# Patient Record
Sex: FEMALE | Race: WHITE
[De-identification: names, ages, dates, MRNs, and addresses within clinical notes are randomized per-mention and may not be internally consistent; named-entity substitution may affect disease eponyms.]

---

## 2018-01-25 ENCOUNTER — HOSPITAL ENCOUNTER (EMERGENCY)
Dept: HOSPITAL 46 - ED | Age: 28
Discharge: HOME | End: 2018-01-25
Payer: COMMERCIAL

## 2018-01-25 VITALS — HEIGHT: 69 IN | WEIGHT: 164.99 LBS | BODY MASS INDEX: 24.44 KG/M2

## 2018-01-25 DIAGNOSIS — Z88.5: ICD-10-CM

## 2018-01-25 DIAGNOSIS — R10.30: Primary | ICD-10-CM

## 2018-01-25 DIAGNOSIS — Z90.49: ICD-10-CM

## 2018-01-25 DIAGNOSIS — Z88.0: ICD-10-CM

## 2018-01-25 DIAGNOSIS — Z98.890: ICD-10-CM

## 2018-01-25 DIAGNOSIS — Z88.8: ICD-10-CM

## 2018-01-25 DIAGNOSIS — F17.200: ICD-10-CM

## 2018-01-25 DIAGNOSIS — Z90.89: ICD-10-CM

## 2018-03-26 ENCOUNTER — HOSPITAL ENCOUNTER (EMERGENCY)
Dept: HOSPITAL 46 - ED | Age: 28
Discharge: HOME | End: 2018-03-26
Payer: COMMERCIAL

## 2018-03-26 VITALS — HEIGHT: 69 IN | WEIGHT: 164.99 LBS | BODY MASS INDEX: 24.44 KG/M2

## 2018-03-26 DIAGNOSIS — F17.200: ICD-10-CM

## 2018-03-26 DIAGNOSIS — N39.0: Primary | ICD-10-CM

## 2018-03-26 DIAGNOSIS — Z88.0: ICD-10-CM

## 2018-03-26 DIAGNOSIS — Z88.8: ICD-10-CM

## 2018-03-26 DIAGNOSIS — Z88.5: ICD-10-CM

## 2018-03-27 ENCOUNTER — HOSPITAL ENCOUNTER (INPATIENT)
Dept: HOSPITAL 46 - ED | Age: 28
LOS: 2 days | Discharge: HOME | DRG: 742 | End: 2018-03-29
Attending: GENERAL PRACTICE | Admitting: GENERAL PRACTICE
Payer: COMMERCIAL

## 2018-03-27 VITALS — BODY MASS INDEX: 24.66 KG/M2 | WEIGHT: 166.49 LBS | HEIGHT: 69 IN

## 2018-03-27 DIAGNOSIS — Z87.74: ICD-10-CM

## 2018-03-27 DIAGNOSIS — Z97.5: ICD-10-CM

## 2018-03-27 DIAGNOSIS — D89.89: ICD-10-CM

## 2018-03-27 DIAGNOSIS — Z88.8: ICD-10-CM

## 2018-03-27 DIAGNOSIS — F17.200: ICD-10-CM

## 2018-03-27 DIAGNOSIS — G62.9: ICD-10-CM

## 2018-03-27 DIAGNOSIS — N73.9: ICD-10-CM

## 2018-03-27 DIAGNOSIS — Z79.899: ICD-10-CM

## 2018-03-27 DIAGNOSIS — Z88.5: ICD-10-CM

## 2018-03-27 DIAGNOSIS — N39.0: ICD-10-CM

## 2018-03-27 DIAGNOSIS — N83.202: Primary | ICD-10-CM

## 2018-03-27 DIAGNOSIS — R11.2: ICD-10-CM

## 2018-03-27 DIAGNOSIS — Z88.0: ICD-10-CM

## 2018-03-27 DIAGNOSIS — N83.201: ICD-10-CM

## 2018-03-27 PROCEDURE — G0378 HOSPITAL OBSERVATION PER HR: HCPCS

## 2018-03-27 NOTE — NUR
PATIENT SITTING UP IN BED RESTING. PATIENT STATED PAIN WAS A 7 OUT OF 10.
PATIENT ASKED WHEN NEXT PAIN MED WAS, THIS CNA TOLD HER SHE DID NOT KNOW BUT
WOULD ASK THE NURSE. RN NOTIFIED.
CALL LIGHT IN REACH. FRESH ICE WATER AT BEDSIDE. NO OTHER NEEDS AT THIS TIME.

## 2018-03-27 NOTE — NUR
UPDATED DR. CORONA REGARDING PATIENT'S PAIN. RECEIVED ORDER TO INCREASE
PERCOCET TO 2 TABS Q4H. ALSO UPDATED DR. CORONA REGARDING BLOODY URINE.
RECEIVED NO NEW ORDERS REGARDING URINE, RN TO MONITOR FOR THE NIGHT.

## 2018-03-27 NOTE — NUR
GAVE PT A WARM PACK TO ASSIST WITH PAIN.  NO PAIN MEDS AVAILABLE FOR 2 MORE
HOURS.  PT HAS FAMILY VISITING.  NO NEEDS AT THIS TIME.

## 2018-03-27 NOTE — NUR
CALLED DR CORONA RE: PAIN CONTROL.  IV DILAUDID NOT EFFECTIVE IN CONTROLLING
PAIN FOR 3 HOURS.  TELEPHONE ORDER TO CHANGE TO PERCOCET , 1 TAB Q4HRS
PRN FOR PAIN.

## 2018-03-27 NOTE — NUR
PT CALLED WITH COMPLAINTS OF NAUSEA AND PAIN AT 7/10. ADMINISTERED ZOFRAN AND
DILAUDID. PT DENIES FURTHER NEEDS AT THIS TIME.

## 2018-03-27 NOTE — NUR
PT ARRIVED BY STRETCHER, ALERT AND ORIENTED. PT ORIENTED TO ROOM, VITAL SIGNS
TAKEN, ADMISSION ASSESSMENT COMPLETE AND CHARTED.  DR CORONA CAME IN TO
DISCUSS UPDATED PLAN OF CARE AND CHANGES TO ABX.

## 2018-03-27 NOTE — NUR
RECEIVED REPORT FROM RN. PATIENT IS RESTING IN BED, BREATHING IS EVEN AND
UNLABORED. REPORTS 7/10 PAIN IN ABD. CALL LIGHT WITHIN REACH.

## 2018-03-27 NOTE — NUR
PATIENT CONTINUES TO HAVE 7/10 PAIN IN PELVIS AND 5/10 BILATERAL FLANK PAIN.
PRN DILAUDID AND PERCOCET GIVEN PER EMAR. DENIES FURTHER NEEDS. ASSESSMENT
DONE. CALL LIGHT WITHIN REACH.

## 2018-03-28 PROCEDURE — 0UPDXHZ REMOVAL OF CONTRACEPTIVE DEVICE FROM UTERUS AND CERVIX, EXTERNAL APPROACH: ICD-10-PCS | Performed by: GENERAL PRACTICE

## 2018-03-28 NOTE — NUR
PT REQUESTING JULIETTE PAD AND UNDERWEAR. REPORTS HAVING SMALL AMOUNT OF VAGINAL
BLEEDING. WILL CONTINUE TO MONITOR.

## 2018-03-28 NOTE — NUR
PT COMPLAINED OF NAUSEA AND ABD PAIN. PAIN 6/10. GAVE 2 TABS PERCOCET 10/325
PO. AT THIS TIME ALSO GAVE 4MG ZOFRAN IV.

## 2018-03-28 NOTE — NUR
md at bedside to remove pts IUD. Pt tolerated well but requesting pain
medication following removal.

## 2018-03-28 NOTE — NUR
PATIENT REPORTS 5/10 PELVIC PAIN, PRN TYLENOL GIVEN PER EMAR. BP OF 95/60
NOTED, WILL CONTINUE TO MONITOR. PATIENT DENIES NEEDS AT THIS TIME. CALL LIGHT
WITHIN REACH.

## 2018-03-28 NOTE — NUR
RECEIVED REPORT FROM RN. PATIENT IS RESTING COMFORTABLY IN BED, BREATHING IS
EVEN AND UNLABORED. FLACC SCORE OF 0. CALL LIGHT WITHIN REACH.

## 2018-03-28 NOTE — NUR
PATIENT REPORTS 6/10 PAIN IN PELVIS, PRN PERCOCET GIVEN PER EMAR. PATIENT
DENIES FURTHER NEEDS. CALL LIGHT WITHIN REACH.

## 2018-03-28 NOTE — NUR
PATIENT RESTING IN BED READING HER
EDUCATION. PATIENT STATED SHE JUST HAD GOTTEN
UP AND WIPED DOWN IN THE BATHROOM AND CHANGED INTO A CLEAN GOWN. THIS CNA SET
PATIENT UP IN BATHROOM FOR ORAL CARE. PATIENT STATED SHE HAD DENTURES BUT
PROBABLY WOULDN'T TAKE THEM OUT WHILE HERE BECAUSE SHE IS UNCOMFORTABLE ABOUT
IT. PATIENT STATED SHE FELT NAUSEOUS BUT WOULD LIKE HER BREAKFAST TRAY LEFT IN
HER ROOM IN CASE SHE FEELS BETTER LATER. CALL LIGHT IN REACH. NO OTHER NEEDS
AT THIS TIME.

## 2018-03-28 NOTE — NUR
PATIENT RESTING IN BED, REPORTS 6/10 PAIN IN PELVIS AND FLANK. PRN PERCOCET
GIVEN PER EMAR, SL ZOFRAN GIVEN FOR NAUSEA. DENIES FURTHER NEEDS. CALL LIGHT
WITHIN REACH.

## 2018-03-28 NOTE — NUR
PATIENT HAS BEEN SLEEPING OFF AND ON THROUGHOUT SHIFT, VSS, URINE OUTPUT QS,
PAIN NOW WELL CONTROLLED WITH INCREASE IN PRN PAIN MEDICATION. PAIN REMAINS IN
PELVIS AND FLANK. PATIENT HAS HAD BLOODY URINE THIS SHIFT, MD WAS MADE AWARE,
ORDERED TO MONITOR OVER NIGHT; NO WORSENING OR IMPROVEMENT. TOLERATING CLEAR
LIQUID DIET, PATIENT STATES "I AM READY FOR SOME REAL FOOD." IV FLUIDS
INFUSING, SBA TO BATHROOM. NO ACUTE CHANGES FROM BEGINNING OF SHIFT.

## 2018-03-28 NOTE — NUR
IN ROOM TO CHECK ON PTS PAIN. PT STATED, "MY PAIN IS BETTER. ITS IS AT A 4."
WILL CONTINUE TO MONITOR PTS PAIN.

## 2018-03-28 NOTE — NUR
PATIENT RESTING COMFORTABLY IN BED, BREATHING IS EVEN AND UNLABORED. REPORTS
6/10 PAIN IN PELVIS, PRN PAIN MEDICATION GIVEN PER EMAR. DENIES FURTHER NEEDS.
CALL LIGHT WITHIN REACH.

## 2018-03-28 NOTE — NUR
PATIENT IS RESTING COMFORTABLY IN BED, BREATHING IS EVEN AND UNLABORED.
REPORTS NAUSEA, 5/10 PAIN IN PELVIS. PRN PROMETHAZINE GIVEN PER EMAR. IV
INTACT, NO SIGNS OF INFILTRATION OR IMFLAMMATION. EDUCATED PATIENT TO NOTIFY
NURSE OF PAIN AT IV SITE, PATIENT STATES UNDERSTANDING. CALL LIGHT WITHIN
REACH.

## 2018-03-28 NOTE — NUR
PATIENT LAYING DOWN RESTING IN BED. PATIENT STATES SHE IS STILL TOO NAUSEOUS
TO EAT. RN NOTIFIED. FRESH ICE WATER AT BEDSIDE TABLE. CALL LIGHT IN REACH. NO
OTHER NEEDS AT THIS TIME.

## 2018-03-28 NOTE — NUR
PATIENT RESTING WITH EYES CLOSED. FRESH ICE WATER AT BEDSIDE. CALL LIGHT IN
REACH. NO OTHER NEEDS AT THIS TIME.

## 2018-03-28 NOTE — NUR
PATIENT RESTING IN BED EYES CLOSED. PATIENT STATES PAIN HAS BEEN AT A 4 OUT OF
10. CALL LIGHT IN REACH. FRESH ICE WATER AT BEDSIDE TABLE. NO OTHER NEEDS AT
THIS TIME.

## 2018-03-28 NOTE — NUR
pt rating pain 6/10 in lower adbomen. gave 0.5mg dilaudid iv. while in room pt
up to bathroom. standby asssit. pt continues to have small amount of
vaginal bleeding.

## 2018-03-28 NOTE — NUR
PATIENT RESTING COMFORTABLY IN BED, BREATHING IS EVEN AND UNLABORED. REPORTS
6/10 PAIN IN PELVIS. PRN DILAUDID GIVEN PER EMAR, BP /75, PULSE IS 64,
RR OF 16 NOTED. WILL CONTINUE TO MONITOR. PATIENT DENIES FURTHER NEEDS. CALL
LIGHT WITHIN REACH.

## 2018-03-29 PROCEDURE — 0UB04ZZ EXCISION OF RIGHT OVARY, PERCUTANEOUS ENDOSCOPIC APPROACH: ICD-10-PCS | Performed by: GENERAL PRACTICE

## 2018-03-29 NOTE — NUR
03/29/18 1118 Caroline Chacon
1111 PT ARRIVED NONAROUSABLE WITH ORAL AIRWAY IN PLACE MAINTAINING
AIRWAY. VSS. RESP EVEN AND UNLABORED.
1118 DECREASED O2 TO 8L O2 %.

## 2018-03-29 NOTE — NUR
PATIENT RESTING IN BED, BREATHING IS EVEN AND UNLABORED. REPORTS 6/10 PAIN IN
PELVIS, PRN DILAUDID GIVEN PER EMAR. BP /54, PULSE IS 59, RR OF 16
NOTED. WILL CONTINUE TO MONITOR VITAL SIGNS. PATIENT REPORTS THAT IV
PROMETHAZINE RELIEVED NAUSEA. NO FURTHER NEEDS AT THIS TIME. CALL LIGHT WITHIN
REACH.

## 2018-03-29 NOTE — NUR
PATIENT RESTIN IN BED ON LEFT SIDE. UPDATED BOARD AND WARM BLANKET GIVEN. CINTHYA
LIGHT IN REACH, NO OTHER NEEDS.

## 2018-03-29 NOTE — NUR
PATIENT WATCHING TV IN BED. VITALS AND I/OS DONE. RT IN TO READ PULSE OX
NUMBERS. CALL LIGHT IN REACH

## 2018-03-29 NOTE — NUR
PT RESTING IN BED. VSS. TOLERATING ROOM AIR, O2 SATS 96%. PT TOLERATING CLEAR
LIQUID, ADVANCED TO REGULAR DIET, ASSISTED WITH ORDERING FOOD. PT DENIES OTHER
NEEDS AT THIS TIME. LUNG SOUNDS CLEAR. BOWLE TONES HYPOACTIVE, PT HAS PASSED
FLATUS.

## 2018-03-29 NOTE — NUR
PT CONTINUES TO BE SLEEPY POST-OP, AROUSABLE TO VOICE. VSS. PT STATES PAIN
4/10. LAP SITES WILL SMALL AMOUNT OF SHADOWING, UNCHANGED FROM ARRIVAL TO
FLOOR. PT DENIES OTHER NEEDS AT THIS TIME.

## 2018-03-29 NOTE — NUR
PATIENT RESTING COMFORTABLY IN BED, BREATHING IS EVEN AND UNLABORED, FLACC
SCORE OF 0. CALL LIGHT WITHIN REACH.

## 2018-03-29 NOTE — NUR
UPDATED DR. CORONA REGARDING PATIENT'S HYPOTENSION AND BRADYCARDIA. PATIENT
STATES SHE IS HAVING 6/10 PAIN IN PELVIS. PER MD, NO MEDICATION TO BE
ADMINISTERED UNTIL AFTER HE IS ABLE TO ASSESS HER. NO OTHER ORDERS AT THIS
TIME.

## 2018-03-29 NOTE — NUR
PT REQUESTING PAIN MEDICATION, RATING PAIN 7/10, GIVEN 0.5 MG IV DILAUDID. PT
MORE AWAKE, ENCOURAGED TO ATTEMPT CLEAR LIQUID DIET. PT VERBALIZED
UNDERSTANDING, ATTEMPTING TO EAT JELLO. PT WEANED TO ROOM AIR, O2 SATS 96%. PT
ASSISTED TO BATHROOM, VOIDED WITHOUT DIFFICULTY, URINE BLOOD TINGES, SMALL
CLOTS VISIBLE IN HAT. ASSISTED BACK TO BED. PT DENIES OTHER NEEDS AT THIS
TIME.

## 2018-03-29 NOTE — NUR
PT TO OR WITH BASILIO DAY SURGERY RN. INFORMED CONSENT SIGN AND ON CHART. PRE
SURGICAL WIPES COMPLETED. PT REFUSED ORAL ANTIBIOTIC DUE TO NAUSEA.

## 2018-03-29 NOTE — NUR
PATIENT'S NIGHT WAS UNEVENTFUL. SHE HAS BEEN RESTING IN BED THROUGHOUT SHIFT.
VSS, URINE OUTPUT QS. CONTINUES TO COMPLAIN OF 5 TO 6/10 PAIN IN PELVIS,
RECEIVING IV DILAUDID PRN. CONTINUES TO HAVE SMALL AMOUNT OF BLOOD FROM
VAGINA, NO ACUTE CHANGES. IV FLUIDS INFUSING, NPO SINCE MIDNIGHT. SBA IN ROOM,
CALLS APPROPRIATELY. NO ACUTE CHANGES FROM BEGINNING OF SHIFT.

## 2018-03-29 NOTE — NUR
PT TOLERATING REGULAR DIET, DENIES NAUSEA. PT GIVEN 1 TAB PERCOCET  MG
IBUPROFEN. DISCUSSED PAIN MANAGEMENT WITH PT, VERBALIZED UNDERSTANDING. PT
DENIES OTHER NEEDS AT THIS TIME.

## 2018-03-29 NOTE — NUR
PT RECEIVED FROM PACU. PT RATING PAIN 5/10, DROWSY BUT AROUSABLE TO VOICE. PT
GIVEN 0.5 MG IV DIALUDID FOR PAIN. PT ONB 2L NC, LUNG SOUNDS CLEAR, CONTINUOUS
PULSE OX IN PLACE. PT WITH LAP SITES X3, SMALL AMOUNT OF SHADOWING TO
BILATERAL LOW ABD DRESSINGS. PT CONTINUES TO COMPLAIN OF NAUSEA, CLEAR LIQUID
DIET, PT ENCOURAGED TO DRINK SLOWLY. DISCUSSED PLAN OF CARE AND POST-OP
TEACHING. PT DENIES OTHER NEEDS AT THIS TIME.

## 2018-03-31 NOTE — OR
[facility]
                                    [address]
                                  [place], [place]  [address]
_________________________________________________________________________________________
                                                                 Signed   
 
 
DATE OF OPERATION:
03/29/2018
 
SURGEON:
Alejandro Grady MD
 
PREOPERATIVE DIAGNOSES:
Pelvic pain, bilateral hemorrhagic ovarian cyst, and pelvic inflammatory disease.
 
POSTOPERATIVE DIAGNOSES:
Pelvic pain, bilateral hemorrhagic ovarian cysts, and pelvic inflammatory disease.
 
PROCEDURE:
Laparoscopic right ovarian cystectomy.
 
ASSISTANT:
Dr. Swanson.
 
ANESTHESIA:
General.
 
ESTIMATED BLOOD LOSS:
25 mL.
 
SPECIMEN:
Ovarian cyst wall.
 
CULTURES:
None.
 
DRAINS:
None.
 
FINDINGS:
Normal cervix.  Normal size, shaped uterus.  Anterior and posterior cul-de-sac was free
of any endometriosis or adhesions.  There was a small amount of clear fluid within the
posterior cul-de-sac.  Left tube was normal in length and the normal-appearing
fimbriated end and the left ovary is normal size and shape without any evidence of
endometriosis or adhesions.  No cyst seen.  Right tube was normal in length and
normal-appearing fimbriated end and no adhesions.  Right ovary was enlarged with a 2.5
cm hemorrhagic ovarian cyst in the posterior aspect of the ovary.  There were no
adhesions or lesions seen.  The appendix appeared normal.  The rest of the pelvis was
 
    Electronically Signed By: ALEJANDRO GRADY MD  03/31/18 0945
_________________________________________________________________________________________
PATIENT NAME:     RUFINO OAKES                   
MEDICAL RECORD #: [id number]            OPERATIVE REPORT              
          ACCT #: [id number]  
DATE OF BIRTH:   [birth year]            REPORT #: [id number]      
PHYSICIAN:        ALEJANDRO GRADY MD      
PCP:              CHAZ MCCAULEY          
REPORT IS CONFIDENTIAL AND NOT TO BE RELEASED WITHOUT AUTHORIZATION
 
 
                                  [facility]
                                    [address], [place]  [address]
_________________________________________________________________________________________
                                                                 Signed   
 
 
free of any masses or adhesions. 
 
COMPLICATIONS:
None.
 
DESCRIPTION OF PROCEDURE:
The patient was brought to the operating room and placed in supine position.  After
adequate general anesthesia was obtained, she was placed in dorsal lithotomy position,
prepped and draped in usual sterile fashion.  Harvey catheter was placed in the bladder.
Weighted speculum was placed in the vagina and the anterior lip of cervix grasped with
an Allis clamp.  Uterine cavity was sounded to 9 cm and Hulka clamp carefully introduced
into the cervical canal, attached to the anterior lip of the cervix.  The Allis clamp
and weighted speculum were removed.  Attention was then drawn to the abdomen. 
 
A small infraumbilical skin incision was made through previous surgical scar after
injecting with 0.5% Marcaine.  Subcutaneous tissue was grasped with hemostats elevated
and nicked with Metzenbaum scissors.  The fascia was identified, grasped with hemostats
elevated and nicked with Metzenbaum scissors, and extended transverse fashion using
Metzenbaum scissors.  Retention stitches of 0 Vicryl suture was placed in the fascia 
above and below
the incision.  Peritoneum was opened with blunt dissection and an S retractor inserted
into the incision and spun 360 degree fashion showing good placement in the abdomen and
no adhesions noted.  The Nitesh cannula and sleeve entered the abdomen along the S
retractor.  The S retractor was then removed and the sleeve tied in place with the
retention stitches.  The trocar was removed and the laparoscope with video attachment
entered the abdomen under direct visualization.  Carbon dioxide was used as distending
medium, the above findings were noted.  A small skin incision was made in the left lower
quadrant after transilluminating the area to avoid any vessels and injecting the area
with 0.5% Marcaine.  A bladed 5 mm trocar and
sleeve then placed through the incision and entered
the abdomen under direct visualization.  Trocar was removed and the blunt
probe inserted.  Sleeve was placed on the right side in the exact same fashion and the
blunt probe inserted.  The above findings were confirmed.  The fluid was irrigated from
the cul-de-sac.  The right ovary was grasped with blunt graspers and elevated and a
small tip Bovie was used to open the right cyst wall and thin avascular portion and thin
blood was noted to come from the cyst.  The incision was opened further using the Bovie
and the cyst irrigated and then the cyst wall could be identified and this was grasped
with 
graspers and gently pulled free of the ovary.  The cyst wall did come out in several
pieces, but cyst wall did seem to completely be removed.  The ovary was irrigated within
the area of the cyst and small amount of bleeding was noted on the lateral aspect inside
the ovary and this was cauterized with laparoscopic tip Bovie and once good hemostasis
 
    Electronically Signed By: ALEJANDRO GRADY MD  03/31/18 0945
_________________________________________________________________________________________
PATIENT NAME:     RUFINO OAKES                   
MEDICAL RECORD #: [id number]            OPERATIVE REPORT              
          ACCT #: [id number]  
DATE OF BIRTH:   [birth year]            REPORT #: [id number]      
PHYSICIAN:        ALEJANDRO GRADY MD      
PCP:              CHAZ MCCAULEY          
REPORT IS CONFIDENTIAL AND NOT TO BE RELEASED WITHOUT AUTHORIZATION
 
 
                                  [facility]
                                    [address], [place]  [address]
_________________________________________________________________________________________
                                                                 Signed   
 
 
was obtained, the cyst area was again irrigated, suctioned and then the cyst filled with
Evicel to help with further coagulation.  The cyst was grasped with blunt graspers and
pressure held.  The cyst was closed for several minutes.  Once this was released, the
entire pelvis was irrigated, suctioned, and examined and noted to have good hemostasis.
The ovary had good hemostasis.  At this point, all instruments were removed from the
abdomen.  The gas allowed to escape and all the sleeves were removed.  The fascia was
closed using running stitch of 0 Vicryl suture.  The remaining portion the Evicel was
placed in incision to help with hemostasis.  The 2 retention stitches were then tied
together for further support of the fascia.  The 3 skin incisions were closed using
subcuticular stitches of 4-0 Vicryl suture.  The Harvey catheter and the Hulka clamp were
removed at the end of the procedure.  The patient tolerated the procedure well and went
to recovery room in good condition.  Sponge, needle, and instrument counts were correct 
at the end of procedure.  The hemorrhagic cyst wall was sent to Pathology for
identification. 
 
 
 
            ________________________________________
            Alejandro Grady MD 
 
 
MJB/MODL
Job #:  [id number]
DD:  03/29/2018 11:23:31
DT:  03/29/2018 18:17:20
 
 
Copies:                                
~
 
 
 
 
 
 
 
 
 
 
 
 
 
 
    Electronically Signed By: ALEJANDRO GRADY MD  03/31/18 0945
_________________________________________________________________________________________
PATIENT NAME:     RUFINO OAKES                   
MEDICAL RECORD #: [id number]            OPERATIVE REPORT              
          ACCT #: [id number]  
DATE OF BIRTH:   [birth year]            REPORT #: [id number]      
PHYSICIAN:        ALEJANDRO GRADY MD      
PCP:              CHAZ MCCAULEY          
REPORT IS CONFIDENTIAL AND NOT TO BE RELEASED WITHOUT AUTHORIZATION

## 2018-04-01 ENCOUNTER — HOSPITAL ENCOUNTER (EMERGENCY)
Dept: HOSPITAL 46 - ED | Age: 28
Discharge: HOME | End: 2018-04-01
Payer: COMMERCIAL

## 2018-04-01 VITALS — HEIGHT: 69 IN | WEIGHT: 166.49 LBS | BODY MASS INDEX: 24.66 KG/M2

## 2018-04-01 DIAGNOSIS — Z88.0: ICD-10-CM

## 2018-04-01 DIAGNOSIS — F17.200: ICD-10-CM

## 2018-04-01 DIAGNOSIS — Z79.899: ICD-10-CM

## 2018-04-01 DIAGNOSIS — G89.18: Primary | ICD-10-CM

## 2018-04-01 DIAGNOSIS — Z88.8: ICD-10-CM

## 2018-04-01 DIAGNOSIS — R10.2: ICD-10-CM

## 2018-04-01 DIAGNOSIS — Z88.5: ICD-10-CM

## 2018-04-01 DIAGNOSIS — Z90.49: ICD-10-CM

## 2018-04-01 PROCEDURE — 0T9B70Z DRAINAGE OF BLADDER WITH DRAINAGE DEVICE, VIA NATURAL OR ARTIFICIAL OPENING: ICD-10-PCS | Performed by: EMERGENCY MEDICINE

## 2018-06-04 ENCOUNTER — HOSPITAL ENCOUNTER (EMERGENCY)
Dept: HOSPITAL 46 - ED | Age: 28
Discharge: HOME | End: 2018-06-04
Payer: COMMERCIAL

## 2018-06-04 VITALS — HEIGHT: 69 IN | BODY MASS INDEX: 24.66 KG/M2 | WEIGHT: 166.5 LBS

## 2018-06-04 DIAGNOSIS — F17.200: ICD-10-CM

## 2018-06-04 DIAGNOSIS — Z88.8: ICD-10-CM

## 2018-06-04 DIAGNOSIS — R10.2: Primary | ICD-10-CM

## 2018-06-04 DIAGNOSIS — Z88.0: ICD-10-CM

## 2018-06-04 DIAGNOSIS — Z88.5: ICD-10-CM

## 2018-08-14 ENCOUNTER — HOSPITAL ENCOUNTER (EMERGENCY)
Dept: HOSPITAL 46 - ED | Age: 28
Discharge: HOME | End: 2018-08-14
Payer: COMMERCIAL

## 2018-08-14 VITALS — HEIGHT: 69 IN | BODY MASS INDEX: 24.66 KG/M2 | WEIGHT: 166.5 LBS

## 2018-08-14 DIAGNOSIS — Z88.8: ICD-10-CM

## 2018-08-14 DIAGNOSIS — Z88.5: ICD-10-CM

## 2018-08-14 DIAGNOSIS — Z88.0: ICD-10-CM

## 2018-08-14 DIAGNOSIS — R10.2: Primary | ICD-10-CM

## 2018-08-14 DIAGNOSIS — Z79.899: ICD-10-CM

## 2018-08-14 DIAGNOSIS — F17.200: ICD-10-CM

## 2018-08-14 PROCEDURE — G0480 DRUG TEST DEF 1-7 CLASSES: HCPCS

## 2023-03-28 NOTE — NUR
PATIENT RESTING IN BED. PATIENT STATES SHE IS NAUSEATED AND ASKED FOR AN
EMESIS BAG. RN IN ROOM. FRESH ICE WATER ON BEDSIDE TABLE. CALL LIGHT IN REACH.
NO OTHER NEEDS AT THIS TIME. Riya Bishop or Nadiya, our Cora and Floyd, will be calling you after your discharge, on the phone number that you provided  They will be available as an additional support, if needed  If you wish to speak with one of them, you may contact Alice Ramirez at [phone] or Abdulkadir Duncan at [phone]  detailed exam